# Patient Record
Sex: FEMALE | URBAN - METROPOLITAN AREA
[De-identification: names, ages, dates, MRNs, and addresses within clinical notes are randomized per-mention and may not be internally consistent; named-entity substitution may affect disease eponyms.]

---

## 2022-04-19 ENCOUNTER — APPOINTMENT (RX ONLY)
Dept: URBAN - METROPOLITAN AREA CLINIC 71 | Facility: CLINIC | Age: 52
Setting detail: DERMATOLOGY
End: 2022-04-19

## 2022-04-19 DIAGNOSIS — L81.0 POSTINFLAMMATORY HYPERPIGMENTATION: ICD-10-CM

## 2022-04-19 DIAGNOSIS — R21 RASH AND OTHER NONSPECIFIC SKIN ERUPTION: ICD-10-CM | Status: IMPROVED

## 2022-04-19 PROCEDURE — ? PRESCRIPTION

## 2022-04-19 PROCEDURE — 99242 OFF/OP CONSLTJ NEW/EST SF 20: CPT

## 2022-04-19 PROCEDURE — ? INVENTORY

## 2022-04-19 PROCEDURE — ? COUNSELING

## 2022-04-19 PROCEDURE — ? RECORDS REVIEWED

## 2022-04-19 PROCEDURE — ? ORDER TESTS

## 2022-04-19 PROCEDURE — ? TREATMENT REGIMEN

## 2022-04-19 RX ORDER — CLOBETASOL PROPIONATE 0.5 MG/G
FTU CREAM TOPICAL BID
Qty: 60 | Refills: 2 | Status: ERX | COMMUNITY
Start: 2022-04-19

## 2022-04-19 RX ADMIN — CLOBETASOL PROPIONATE FTU: 0.5 CREAM TOPICAL at 00:00

## 2022-04-19 ASSESSMENT — LOCATION ZONE DERM
LOCATION ZONE: ARM
LOCATION ZONE: TRUNK

## 2022-04-19 ASSESSMENT — LOCATION DETAILED DESCRIPTION DERM
LOCATION DETAILED: RIGHT SUPERIOR UPPER BACK
LOCATION DETAILED: RIGHT PROXIMAL POSTERIOR UPPER ARM
LOCATION DETAILED: LEFT PROXIMAL LATERAL POSTERIOR UPPER ARM
LOCATION DETAILED: LEFT MEDIAL UPPER BACK
LOCATION DETAILED: LEFT SUPERIOR UPPER BACK

## 2022-04-19 ASSESSMENT — LOCATION SIMPLE DESCRIPTION DERM
LOCATION SIMPLE: LEFT UPPER BACK
LOCATION SIMPLE: RIGHT POSTERIOR UPPER ARM
LOCATION SIMPLE: LEFT POSTERIOR UPPER ARM
LOCATION SIMPLE: RIGHT UPPER BACK

## 2022-04-19 NOTE — PROCEDURE: ORDER TESTS
Performing Laboratory: [id number]
Bill For Surgical Tray: no
Expected Date Of Service: 04/19/2022
Billing Type: Third-Party Bill

## 2022-04-19 NOTE — PROCEDURE: RECORDS REVIEWED
Detail Level: Zone
Number Of Unique Sources Reviewed: 1
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

## 2022-04-19 NOTE — PROCEDURE: TREATMENT REGIMEN
Otc Regimen: Vanicream moisturizer; apply twice daily \\nVanicream z bar; wash twice daily
Plan: 4+ mo h/o of truncal rash occurring 24 hrs post COVID-19 booster. \\nReviewed outside histology from lower back - spongiotic dermatitis.\\nPatient has hx of joint pain, poss butterfly rash, and fibromyalgia. Recommend SLE work-up as this was unable to be r/o via histology. \\nConsider repeat biopsy with DIF in future PRN.\\nRheumatologist: Dr. Menjivar.
Detail Level: Zone
Continue Regimen: Clobetasol cream; apply to the affected area twice daily

## 2022-04-19 NOTE — HPI: RASH
What Type Of Note Output Would You Prefer (Optional)?: Standard Output
How Severe Is Your Rash?: mild
Is This A New Presentation, Or A Follow-Up?: Rash
Additional History: Patient had rash biopsied by pcp. 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 Patient applied triamcinalone cream once daily x 3 weeks. No topical applied since February